# Patient Record
Sex: MALE | Race: ASIAN | NOT HISPANIC OR LATINO | URBAN - METROPOLITAN AREA
[De-identification: names, ages, dates, MRNs, and addresses within clinical notes are randomized per-mention and may not be internally consistent; named-entity substitution may affect disease eponyms.]

---

## 2017-01-01 ENCOUNTER — COMMUNICATION - HEALTHEAST (OUTPATIENT)
Dept: FAMILY MEDICINE | Facility: CLINIC | Age: 62
End: 2017-01-01

## 2017-01-01 ENCOUNTER — OFFICE VISIT - HEALTHEAST (OUTPATIENT)
Dept: FAMILY MEDICINE | Facility: CLINIC | Age: 62
End: 2017-01-01

## 2017-01-01 ENCOUNTER — RECORDS - HEALTHEAST (OUTPATIENT)
Dept: ADMINISTRATIVE | Facility: OTHER | Age: 62
End: 2017-01-01

## 2017-01-01 ENCOUNTER — COMMUNICATION - HEALTHEAST (OUTPATIENT)
Dept: SCHEDULING | Facility: CLINIC | Age: 62
End: 2017-01-01

## 2017-01-01 DIAGNOSIS — R09.89 ABNORMAL LUNG SOUNDS: ICD-10-CM

## 2017-01-01 DIAGNOSIS — N50.89 SCROTAL SWELLING: ICD-10-CM

## 2017-01-01 DIAGNOSIS — N18.6 ESRD (END STAGE RENAL DISEASE) (H): ICD-10-CM

## 2017-01-01 DIAGNOSIS — J18.9 LEFT LOWER LOBE PNEUMONIA: ICD-10-CM

## 2017-01-01 DIAGNOSIS — R79.81 LOW OXYGEN SATURATION: ICD-10-CM

## 2017-01-01 DIAGNOSIS — I10 ESSENTIAL HYPERTENSION WITH GOAL BLOOD PRESSURE LESS THAN 140/90: ICD-10-CM

## 2017-01-01 DIAGNOSIS — F43.23 ADJUSTMENT DISORDER WITH MIXED ANXIETY AND DEPRESSED MOOD: ICD-10-CM

## 2017-01-01 DIAGNOSIS — E11.22 TYPE 2 DIABETES MELLITUS WITH CHRONIC KIDNEY DISEASE ON CHRONIC DIALYSIS (H): ICD-10-CM

## 2017-01-01 DIAGNOSIS — R91.8 LUNG NODULES: ICD-10-CM

## 2017-01-01 DIAGNOSIS — Z99.2 TYPE 2 DIABETES MELLITUS WITH CHRONIC KIDNEY DISEASE ON CHRONIC DIALYSIS (H): ICD-10-CM

## 2017-01-01 DIAGNOSIS — J18.9 RIGHT LOWER LOBE PNEUMONIA: ICD-10-CM

## 2017-01-01 DIAGNOSIS — K29.70 GASTRITIS: ICD-10-CM

## 2017-01-01 DIAGNOSIS — I25.10 CORONARY ARTERY DISEASE: ICD-10-CM

## 2017-01-01 DIAGNOSIS — N18.6 TYPE 2 DIABETES MELLITUS WITH CHRONIC KIDNEY DISEASE ON CHRONIC DIALYSIS (H): ICD-10-CM

## 2017-01-01 ASSESSMENT — MIFFLIN-ST. JEOR: SCORE: 1389.08

## 2017-04-11 ENCOUNTER — COMMUNICATION - HEALTHEAST (OUTPATIENT)
Dept: FAMILY MEDICINE | Facility: CLINIC | Age: 62
End: 2017-04-11

## 2017-04-11 DIAGNOSIS — K29.70 GASTRITIS: ICD-10-CM

## 2021-05-29 ENCOUNTER — RECORDS - HEALTHEAST (OUTPATIENT)
Dept: ADMINISTRATIVE | Facility: CLINIC | Age: 66
End: 2021-05-29

## 2021-05-30 VITALS — WEIGHT: 165.4 LBS | BODY MASS INDEX: 26.7 KG/M2

## 2021-05-30 VITALS — WEIGHT: 162 LBS | BODY MASS INDEX: 29.81 KG/M2 | HEIGHT: 62 IN

## 2021-05-30 VITALS — WEIGHT: 162 LBS | BODY MASS INDEX: 29.63 KG/M2

## 2021-05-30 VITALS — WEIGHT: 163.5 LBS | BODY MASS INDEX: 29.9 KG/M2

## 2021-06-01 ENCOUNTER — RECORDS - HEALTHEAST (OUTPATIENT)
Dept: ADMINISTRATIVE | Facility: CLINIC | Age: 66
End: 2021-06-01

## 2021-06-08 NOTE — PROGRESS NOTES
Chief Complaint   Patient presents with     Groin Swelling     x one day       HPI:    Patient is here for a few days of left scrotal swelling without pain nor itching. No recent unusual contacts nor exposures. No urinary symptoms. No fever, chills, abdominal pain.     ROS: Pertinent ROS noted in HPI.     Allergies   Allergen Reactions     Lisinopril Cough       Patient Active Problem List   Diagnosis     Anemia due to other cause     Pneumonia     Acute on chronic diastolic (congestive) heart failure     ESRD (end stage renal disease)     HCAP (healthcare-associated pneumonia)     Diabetes mellitus, type 2     Mood disorder due to stroke     Benign Essential Hypertension     TB lung, latent     Thrombocytopenia     Erectile dysfunction     Cirrhosis     Cerebrovascular accident, old     Cataract, nuclear     Chronic hepatitis B     Intragel vitreous hemorrhage     Acute gastritis     Hyperkalemia     Accelerated hypertension     Major depression, chronic     Adjustment disorder with mixed anxiety and depressed mood     Cognitive retention disorder     Encephalopathy     Fever, unspecified fever cause     Acute metabolic encephalopathy     Lung nodules     Abnormal chest CT     Gram-positive cocci bacteremia     Syncope     Acute encephalopathy     Acute respiratory failure with hypoxia     ESRD on hemodialysis     Anemia of chronic disease     Fluid overload     Coronary artery disease     Benign paroxysmal positional nystagmus       Family History   Problem Relation Age of Onset     Diabetes Brother      Cancer Brother      No Medical Problems Son      homicide     Seizures Son        Social History     Social History     Marital status:      Spouse name: N/A     Number of children: N/A     Years of education: N/A     Occupational History     Not on file.     Social History Main Topics     Smoking status: Former Smoker     Years: 10.00     Quit date: 8/27/2005     Smokeless tobacco: Never Used     Alcohol  use No     Drug use: No     Sexual activity: Not on file     Other Topics Concern     Not on file     Social History Narrative    Lives with wife.         Objective:    Vitals:    01/20/17 1735   BP: 132/60   Pulse: 76   Temp: 98.4  F (36.9  C)   SpO2: 98%       Gen: NAD  CV: RRR, no M, R, G  Pulm: CTAB  Abd: soft, normal bowel sounds, no tenderness, no HSM/mass.   : normal penis. Mild swelling of left anterior scrotum with minimal erythema. No tenderness to palpation. No warmth. Normal testicular exam bilaterally without mass.       Scrotal swelling - exact etiology unclear, rule out cellulitis vs cutaneous candidiasis. Follow up as directed.   -     cephalexin (KEFLEX) 250 MG capsule; Take 1 capsule (250 mg total) by mouth daily for 10 days.  -     clotrimazole (LOTRIMIN) 1 % cream; Apply to affected area (left scrotum) two times daily for a week.

## 2021-06-08 NOTE — PROGRESS NOTES
Pre-operative Exam  [facility]  [phone]    Willian Caruso,   STEPHANIE 1955    Upcoming surgery date: 3/7/17  Surgeon: Dr. Kervin Orta  Surgery Facility: cataract left eye surgery  [facility] same day surgery center     Chief Complaint: preop    Subjective  History of Present Illness:   Willian Caruso is a 60 y.o. male with a cataract.     Patient with DMII x > 10 years with prolifrative diabetic retinopathy OU s/p PRP, vit heme/ERM OD, cataracts and macular edema. Patient unable to see well and would like to have cataract surgery.    Cancelled previously due to medical illness.    Active Problem List:  Patient Active Problem List   Diagnosis     Chronic diastolic heart failure     ESRD (end stage renal disease)     Diabetes mellitus, type 2     Mood disorder due to stroke     Essential hypertension     TB lung, latent     Thrombocytopenia     Erectile dysfunction     Cirrhosis     Cerebrovascular accident, old     Cataract, nuclear     Chronic hepatitis B     Intragel vitreous hemorrhage     Acute gastritis     Hyperkalemia     Major depression, chronic     Adjustment disorder with mixed anxiety and depressed mood     Cognitive retention disorder     Lung nodules     Syncope     Anemia of chronic disease     Coronary artery disease     Benign paroxysmal positional nystagmus       History:  Past Medical History:   Diagnosis Date     Anemia in chronic kidney disease 10/31/2014     Benign paroxysmal positional nystagmus 2014     CHF (congestive heart failure)      Coronary artery disease      Depression      Diabetes mellitus      ESRD on dialysis      Gram-positive cocci bacteremia      HCAP (healthcare-associated pneumonia) 2016     Hepatitis B     Antigen negative, Hep B antibody and hep B DNA positive     Hyperkalemia      Hyperlipidemia      Hypertension      Sepsis 2016     Stroke      TB lung, latent     treated in  at [facility] clinic, positive quant gold in       TIA (transient ischemic attack)        Past Surgical History:   Procedure Laterality Date     AV FISTULA PLACEMENT       PICC  10/27/2016          REFRACTIVE SURGERY Left     per pt report     TONSILLECTOMY        Surgical Risks:  History of bleeding: None   History of tobacco use: past, as below.  History of anesthesia reactions: None    Social History:  he  reports that he quit smoking about 11 years ago. He quit after 10.00 years of use. He has never used smokeless tobacco. He reports that he does not drink alcohol or use illicit drugs.    Family History:  Family History   Problem Relation Age of Onset     Diabetes Brother      Cancer Brother      No Medical Problems Son      homicide     Seizures Son      Current Medications:  Current Outpatient Prescriptions   Medication Sig Dispense Refill     aspirin 81 MG EC tablet Take 1 tablet (81 mg total) by mouth daily. 90 tablet 0     atorvastatin (LIPITOR) 40 MG tablet Take 1 tablet (40 mg total) by mouth daily. 90 tablet 3     calcium acetate (PHOSLO) 667 mg capsule Take 667 mg by mouth 3 (three) times a day with meals.       furosemide (LASIX) 40 MG tablet Take 1 tablet (40 mg total) by mouth daily. 90 tablet 1     insulin glargine (LANTUS SOLOSTAR) 100 unit/mL (3 mL) pen Inject 12 Units under the skin bedtime. 3 mL PRN     metoprolol succinate (TOPROL-XL) 200 MG 24 hr tablet Take 200 mg by mouth daily.       multivitamin with minerals (THERA-M) 9 mg iron-400 mcg Tab tablet Take 1 tablet by mouth daily.       nitroglycerin (NITROSTAT) 0.4 MG SL tablet Place 0.4 mg under the tongue every 5 (five) minutes as needed for chest pain.       senna (SENOKOT) 8.6 mg tablet Take 1 tablet by mouth daily. 30 tablet 1     tenofovir (VIREAD) 300 mg tablet Take 300 mg by mouth once a week. Friday after dialysis       amLODIPine (NORVASC) 10 MG tablet Take 1 tablet (10 mg total) by mouth daily. 90 tablet 1     calcium acetate (PHOSLO) 667 mg capsule Take 667 mg by mouth 2 (two)  "times a day as needed (snacks).       cholecalciferol, vitamin D3, 5,000 unit Tab Take 1 tablet (5,000 Units total) by mouth bedtime. 90 tablet 0     clotrimazole (LOTRIMIN) 1 % cream Apply to affected area (left scrotum) two times daily for a week. 30 g 0     omeprazole (PRILOSEC) 20 MG capsule Take 1 capsule (20 mg total) by mouth 2 (two) times a day. 180 capsule 2     sertraline (ZOLOFT) 50 MG tablet Take 1 tablet (50 mg total) by mouth 2 (two) times a day. 180 tablet 1     traZODone (DESYREL) 50 MG tablet Take 2-3 tablets (100-150 mg total) by mouth bedtime. 180 tablet 1     Allergies:  Lisinopril    Review of Systems:  GEN: no fevers or chills   ENT: no sinus concerns, a little stuff nose  RESP: has chronic cough, no wheezing   CV: no dyspnea, palpitations, edema or chest pain   GI: no nausea, vomiting, diarrhea, or constipation   : normal urination   ENDO: no hot or cold intolerance, no polydipsia or polyuria   MS: no myalgias, has left knee pain  DERM: no rash or bruising   PSYCH: ongoing depression     Objective:  Visit Vitals     /70 (Patient Site: Right Arm, Patient Position: Sitting, Cuff Size: Adult Regular)     Pulse 64     Temp 97.3  F (36.3  C) (Oral)     Resp 16     Ht 5' 2\" (1.575 m)     Wt 162 lb (73.5 kg)     BMI 29.63 kg/m2      Gen:   Alert, not distressed  Head:   Normocephalic, without obvious abnormality, atraumatic  Eyes:   PERRL, conjunctiva/corneas clear, EOM's intact  Ears:   Normal tympanic membranes and external ear canals  Nose:    Mucosa normal, no drainage or sinus tenderness  Throat:    No erythema or exudates  Neck:    No adenopathy no nodules in thyroid, normal ROM  Lungs:    Clear to auscultation bilaterally, respirations unlabored  Chest wall:  No tenderness or deformity  Heart:     Regular, normal S1 and S2, no murmur, gallop or rub  Abdomen:  Soft, non-tender, normal bowel sounds, no masses, no organomegaly  Back:    Symmetric, no curvature, ROM normal, no CVA " tenderness  Extremities:   Extremities normal, atraumatic, no cyanosis or edema  Skin:     Skin color, texture, turgor normal, no rashes or lesions  Lymph nodes:   Cervical and supraclavicular nodes normal  Neurologic:   Alert. Weakness of arm and leg.    Assessment:  Cataracts.  Prolifrative diabetic retinopathy OU s/p PRP, vit heme/ERM OD, cataracts and macular edema.     Plan:  Patient is optimized to proceed with the proposed procedure.  Anesthesia: standard    Sim Simmons MD  2/16/2017 11:32 AM

## 2021-06-09 NOTE — PROGRESS NOTES
Chief Complaint   Patient presents with     Dizziness     started Monday or Tuesday. at this time he was dizzy, felt like he blacked out and then fell.        History of Present Illness: Nursing notes reviewed. Patient has smoked 3-4 PPD of tobaccos, but quit about 10 years ago. Patient states that he was getting off his bed reaching for his cane, when he felt dizzy, falling and hitting his oxygen machine. After this, he did not feel any pain in head or throughout his body. The fall was witnessed by his wife who came to assist him. He was not unconscious for more then a few seconds, less then a minute. His SpO2 is noted to be lower today. It was 98% on 01/20/2017. Patient complained of feeling dizzy yesterday, and he seemed weaker per his mom. He seemed weaker this past weekend, about 5-6 days ago per family. At time of exam, he does not have breathing complaint. The main concern of daughter at time of exam is dizziness.    Review of systems: See history of present illness, otherwise negative.     No current outpatient prescriptions on file.     No current facility-administered medications for this visit.        Past Medical History:   Diagnosis Date     Anemia in chronic kidney disease 10/31/2014     Benign paroxysmal positional nystagmus 12/2/2014     CHF (congestive heart failure)      Coronary artery disease      Depression      Diabetes mellitus      ESRD on dialysis      Gram-positive cocci bacteremia      HCAP (healthcare-associated pneumonia) 1/16/2016     Hepatitis B     Antigen negative, Hep B antibody and hep B DNA positive     Hyperkalemia      Hyperlipidemia      Hypertension      Sepsis 1/16/2016     Stroke      TB lung, latent     treated in 1990's at [facility] TB clinic, positive quant gold in 2015     TIA (transient ischemic attack)       Past Surgical History:   Procedure Laterality Date     AV FISTULA PLACEMENT       PICC  10/27/2016          REFRACTIVE SURGERY Left     per pt report      TONSILLECTOMY        Social History     Social History     Marital status:      Spouse name: N/A     Number of children: N/A     Years of education: N/A     Social History Main Topics     Smoking status: Former Smoker     Years: 10.00     Quit date: 8/27/2005     Smokeless tobacco: Never Used     Alcohol use No     Drug use: No     Sexual activity: Not Asked     Other Topics Concern     None     Social History Narrative    Lives with wife.       History   Smoking Status     Former Smoker     Years: 10.00     Quit date: 8/27/2005   Smokeless Tobacco     Never Used      Exam:   Blood pressure 132/74, pulse 72, temperature 97.8  F (36.6  C), temperature source Oral, resp. rate 16, weight 163 lb 8 oz (74.2 kg), SpO2 92 %.    EXAM:   Wt Readings from Last 3 Encounters:   02/24/17 163 lb 8 oz (74.2 kg)   02/16/17 162 lb (73.5 kg)   01/20/17 165 lb 6.4 oz (75 kg)     Temp Readings from Last 3 Encounters:   02/24/17 97.8  F (36.6  C) (Oral)   02/16/17 97.3  F (36.3  C) (Oral)   01/20/17 98.4  F (36.9  C) (Oral)     BP Readings from Last 3 Encounters:   02/24/17 132/74   02/16/17 128/70   01/20/17 132/60     Pulse Readings from Last 3 Encounters:   02/24/17 72   02/16/17 64   01/20/17 76     General: Vital signs reviewed. Patient is in no acute appearing distress. He looks tired. Breathing is non labored appearing. Patient is oriented x 3, speaking with daughter, and wishing me well at time of discharge.   ENT: Tympanic membranes are clear and without injection bilaterally, nasal turbinates show no injection or rhinorrhea, no pharyngeal injection or exudate.  Eye: PERRL with normal consensual gaze, and normal pupil size.  Heart:  Heart rate is normal, regular rhythm without murmur.  Lungs:  Lung sounds are clear to auscultation with good airflow except for crackles noted in right lower lobe and left lower lobe.  Skin: warm ands dry, with about 1-2 mm pitting bilateral ankle edema.    Recent Results (from the past 24  hour(s))   HM1 (CBC with Diff)   Result Value Ref Range    WBC 4.2 4.0 - 11.0 thou/uL    RBC 3.53 (L) 4.40 - 6.20 mill/uL    Hemoglobin 10.8 (L) 14.0 - 18.0 g/dL    Hematocrit 33.5 (L) 40.0 - 54.0 %    MCV 95 80 - 100 fL    MCH 30.6 27.0 - 34.0 pg    MCHC 32.1 32.0 - 36.0 g/dL    RDW 15.3 (H) 11.0 - 14.5 %    Platelets 118 (L) 140 - 440 thou/uL    MPV 7.9 7.0 - 10.0 fL    Neutrophils % 73 (H) 50 - 70 %    Lymphocytes % 15 (L) 20 - 40 %    Monocytes % 9 2 - 10 %    Eosinophils % 2 0 - 6 %    Basophils % 0 0 - 2 %    Neutrophils Absolute 3.1 2.0 - 7.7 thou/uL    Lymphocytes Absolute 0.7 (L) 0.8 - 4.4 thou/uL    Monocytes Absolute 0.4 0.0 - 0.9 thou/uL    Eosinophils Absolute 0.1 0.0 - 0.4 thou/uL    Basophils Absolute 0.0 0.0 - 0.2 thou/uL    Results from exam reviewed with patient.    Assessment/Plan   1. Low oxygen saturation  XR Chest PA and Lateral    HM1(CBC and Differential)    HM1 (CBC with Diff)   2. Abnormal lung sounds  XR Chest PA and Lateral    HM1(CBC and Differential)    HM1 (CBC with Diff)   3. Left lower lobe pneumonia     4. Right lower lobe pneumonia         Patient Instructions   Go directly to [facility] where you will be cared for by Dr. Khan, whom I spoke with this evening.     Marcelo Bartholomew DO

## 2021-06-09 NOTE — PROGRESS NOTES
Subjective:  61 y.o. male with concerns hospital follow up.  Here with daughter who provides some history.  He went to urgent care with dizziness.  Low oxygen saturations and increased work of breathing were noted.  He was directly admitted for a dialysis run.  Admission and discharge notes were reviewed.  He is admitted with fluid overload and required an extra session of dialysis.  After this he was back to normal again.    No problems have been noted since his return home.  He has not missed any dialysis sessions.  He is back to his normal self.  Daughter is concerned that his dry weight may be incorrectly estimated.  She feels that after a hospital dialysis is completed that he has a significantly improved cardiopulmonary status compared to when dialysis is encompassing the outpatient setting.  She reports to have been difficulties with maintaining his blood pressure at dialysis.    Medications were reconciled as able.  The family did not bring the medications to clinic today.    Objective:  Visit Vitals     /78 (Patient Site: Right Arm, Patient Position: Sitting, Cuff Size: Adult Regular)     Pulse 68     Temp 97.8  F (36.6  C) (Oral)     Wt 162 lb (73.5 kg)  Comment: with jacket/shoes     BMI 29.63 kg/m2     GENERAL: alert, not distressed  EYES: PERRL/EOMI, no scleral icterus, no conjunctival injection   CHEST: clear, no rales, rhonchi, or wheezes  CARDIAC: regular without murmur  ABDOMEN: soft, non tender, non distended, normal bowel sounds  NEURO: alert.  Speech is slow but at baseline. Left sided weakness is stable.    Assessment and Plan:   1. ESRD (end stage renal disease)  Sr. concerns about the effectiveness of dialysis particularly outpatient setting if it would be reasonable for him to reconsult with nephrology in their clinic.  Daughter is in agreement with this.  May be able to manage increasing the fluid removed if his blood pressure medicines are decreased.    He is still scheduled for  cataract removal later this month.  I don't see a need to postpone the procedure any further.    - Ambulatory referral to Nephrology     This visit was conducted with the aid of a professional .